# Patient Record
(demographics unavailable — no encounter records)

---

## 2025-01-28 NOTE — HISTORY OF PRESENT ILLNESS
[de-identified] : Patient referred by Dr. [place] for evaluation of right thyroid nodule. During the workup of right shoulder pain, patient was noted to have a thyroid nodule. Thyroid ultrasound, June 2021: Right low 5.4 x 1.5 x 1 CM with mid lower nodule measuring 2.5 x 1.1 x 0.9 CM. Left lobe 4.3 x 1.6 x 0.9 CM, no nodules noted. No enlarged lymph nodes. Patient denies prior history of thyroid disease, dysphagia, change in voice or radiation exposure.  Biopsy right nodule June 2021 benign.  Follow-up thyroid ultrasound 2021: Right nodule 2.5 x 1 x 1.6 cm without significant change.  No other nodules noted. thyroid US 6/2022 and 8/2023 stable right nodule. repeat US 7/2024 with slight increase in right nodule now 2.9 cm, was 2.5 cm 3 years ago with benign biopsy at that time. Biopsy 7/2024 benign.  US 1/2025 with stable nodule at 3 cm. denies recent illness.   I have reviewed all old and new data and available images.

## 2025-01-28 NOTE — ASSESSMENT
[FreeTextEntry1] : Patient with  right thyroid nodule. Ultrasound-guided fine-needle aspiration benign. stabl on f/u US 1/2025, continue to monitor with US 7/2025, RTO 6 mo. I have answered their questions to the best of my ability.

## 2025-01-28 NOTE — PHYSICAL EXAM
[de-identified] : no cervical or supraclavicular adenopathy, trachea midline, thyroid full, without enlargement without  palpable discreet mass [Normal] : no neck adenopathy [de-identified] : Skin:  normal appearance.  no rash, nodules, vesicles, or erythema,\par  Musculoskeletal:  full range of motion and no deformities appreciated\par  Neurological:  grossly intact\par  Psychiatric:  oriented to person, place and time with appropriate affect